# Patient Record
(demographics unavailable — no encounter records)

---

## 2025-01-28 NOTE — PHYSICAL EXAM
[] : septum deviated to the right [Normal] : mucosa is normal [Midline] : trachea located in midline position [de-identified] : dry skin, CI AU

## 2025-01-28 NOTE — REVIEW OF SYSTEMS
[Ear Pain] : ear pain [Ear Itch] : ear itch [Nasal Congestion] : nasal congestion [Cough] : cough [Negative] : Heme/Lymph [FreeTextEntry1] : fatigue

## 2025-01-28 NOTE — HISTORY OF PRESENT ILLNESS
[de-identified] : 73 yr old female c/o itchy ears for a long time left ear is bothering her a lot and is clogged -tinnitus, dizzy +Qtips

## 2025-06-27 NOTE — REASON FOR VISIT
[FreeTextEntry2] : Past 2 months achy bilateral shoulder pain with pain level 6 active and rest 0./ [facility] Bilateral knee pain past 3 months with pain level active8 rest 0

## 2025-06-27 NOTE — HISTORY OF PRESENT ILLNESS
[Intermittent] : intermittent [Walking] : walking [Retired] : Work status: retired [de-identified] : 6/27/25  Initial visit for this 74 year old female RHD complaining of spontaneous onset of rt shoulder pain and clicking x last 3-4 months duration. Has similar but milder on the left. Can't lie on either shoulder at night. Occasional wake up pain at night. Tried motrin 600mg prn with little relief. Tried biofreeze as well.    Also c/o bilateral knee pain. Has a long hx of OA lt knee x last 6-8 years. Had a series of gel injections by Dr. Holder x 4-5 years ago. Rt knee pain started a few months ago. Worse kneeling and squatting No rt knee pain at rest.  PMH: NO prior shoulder issues  PMH:Hx of breast Ca            s/p mastectomies            Hx of osteoporosis [] : no [FreeTextEntry1] : bilateral shoulders and bilateral knees [FreeTextEntry9] : bio freeze [de-identified] : Shoulders - Lifting pulling [de-identified] : Dr Tianna Garces [de-identified] : 3/25 [de-identified] : 2/25

## 2025-06-27 NOTE — PLAN
[TextEntry] : The patient was advised of the diagnosis. The natural history of the pathology was explained in full to the patient in layman's terms. All questions were answered. The risks and benefits of surgical and non-surgical treatment alternatives were explained in full to the patient.  Medication was injected into the right SA area. After verbal consent using sterile preparation and technique. The risks, benefits, and alternatives to cortisone injection were explained in full to the patient. Risks outlined include but are not limited to infection, sepsis, bleeding, scarring, skin discoloration, temporary increase in pain, syncopal episode, failure to resolve symptoms, allergic reaction, symptom recurrence, and elevation of blood sugar in diabetics. Patient understood the risks. All questions were answered. After discussion of options, patient requested an injection. Oral informed consent was obtained and sterile prep was done of the injection site. Sterile technique was utilized for the procedure including the preparation of the solutions used for the injection. Patient tolerated the procedure well. Advised to ice the injection site this evening. Prep with Betadine locally to site. Sterile technique used. Patient tolerated procedure well. Post Procedure Instructions: Patient was advised to call if redness, pain, or fever occur and apply ice for 15 min. out of every hour for the next 12-24 hours as tolerated.  The patient was instructed on the importance of ice and elevation of the extremity to decrease swelling and pain.  Recommend over the counter medications on as needed basis.

## 2025-06-27 NOTE — PHYSICAL EXAM
[Normal Mood and Affect] : normal mood and affect [Able to Communicate] : able to communicate [Well Developed] : well developed [Well Nourished] : well nourished [NL (0-90)] : full external rotation 0-90 degrees [Type 3 acromion] : Type 3 acromion [Type 1 acromion] : Type 1 acromion [Right] : right knee [Left] : left knee [5___] : hamstring 5[unfilled]/5 [Bilateral] : knee bilaterally [AP] : anteroposterior [Lateral] : lateral [[place]] : [place] [There are no fractures, subluxations or dislocations. No significant abnormalities are seen] : There are no fractures, subluxations or dislocations. No significant abnormalities are seen [de-identified] : thin [de-identified] : +JAN [TWNoteComboBox4] : passive forward flexion 170 degrees [TWNoteComboBox7] : False [] : negative Lachmann [FreeTextEntry8] : clicking of patella [FreeTextEntry9] : chondrocalcinosis BL knees Lt >> Rt, mild OA BL Knees